# Patient Record
(demographics unavailable — no encounter records)

---

## 2024-12-13 NOTE — PHYSICAL EXAM
[2+] : left foot dorsalis pedis 2+ [Sensation] : the sensory exam was normal to light touch and pinprick [No Focal Deficits] : no focal deficits [Deep Tendon Reflexes (DTR)] : deep tendon reflexes were 2+ and symmetric [Motor Exam] : the motor exam was normal [Ankle Swelling (On Exam)] : not present [Varicose Veins Of Lower Extremities] : not present [] : not present [Delayed in the Right Toes] : capillary refills normal in right toes [Delayed in the Left Toes] : capillary refills normal in the left toes [FreeTextEntry3] : Hair growth noted on digits. Proximal to distal cooling is within normal limits.  [FreeTextEntry1] : She has a 3 to 4mm skin lesion at the left sub 5th metatarsal. It is possible it could be atypical wart. It is possible it could be from an old puncture or deep porokeratotic lesion. It appears benign. It is very painful with direct and side to side palpation. [Diminished Throughout Right Foot] : normal sensation with monofilament testing throughout right foot [Diminished Throughout Left Foot] : normal sensation with monofilament testing throughout left foot

## 2024-12-13 NOTE — HISTORY OF PRESENT ILLNESS
[FreeTextEntry1] : Patient presents today complaining of a painful skin lesion that she has been told were multiple things including a wart. It is routinely shaved and comes back quickly. The last time it was shaved was just 2 weeks ago and the pain level gets as bad as 7/10. It really is interfering with her ability to exercise or walk and she presents today asking for treatment options.

## 2024-12-13 NOTE — ASSESSMENT
[FreeTextEntry1] : Impression: Atypical wart, left foot. Pain.  Treatment: Today I trimmed the area, after prepping with alcohol, and applied an aperture pad. I discussed punch excisional biopsy procedure with the patient. I discussed procedure and postoperative course, but I want her to be off Advil before we do it, so she will reschedule to have it done next week.  Discussed excisional biopsy and possibility of recurrence. She will follow-up for evaluation and treatment.

## 2024-12-20 NOTE — HISTORY OF PRESENT ILLNESS
[FreeTextEntry1] : Patient presents today for evaluation of skin lesion at the base of the 5th metatarsal, left side. It has been treated by multiple people. It is painful, deep and we discussed surgical excision. She presents today for surgical excision.

## 2024-12-20 NOTE — PHYSICAL EXAM
[2+] : left foot dorsalis pedis 2+ [Sensation] : the sensory exam was normal to light touch and pinprick [No Focal Deficits] : no focal deficits [Deep Tendon Reflexes (DTR)] : deep tendon reflexes were 2+ and symmetric [Motor Exam] : the motor exam was normal [Ankle Swelling (On Exam)] : not present [Varicose Veins Of Lower Extremities] : not present [] : not present [Delayed in the Right Toes] : capillary refills normal in right toes [Delayed in the Left Toes] : capillary refills normal in the left toes [FreeTextEntry3] : Hair growth noted on digits. Proximal to distal cooling is within normal limits.  [FreeTextEntry1] : She has a 3 to 4mm skin lesion at the left sub 5th metatarsal.  It appears benign. It is very painful with direct and side to side palpation. [Diminished Throughout Right Foot] : normal sensation with monofilament testing throughout right foot [Diminished Throughout Left Foot] : normal sensation with monofilament testing throughout left foot

## 2024-12-20 NOTE — ASSESSMENT
[FreeTextEntry1] : Impression: Neoplasm left foot. Pain.  Treatment: I went through consent for surgical excision/excisional biopsy left foot lesion. It is at the base of the 5th metatarsal. it appears completely benign.  I went through detailed consent for excisional biopsy. I discussed recurrence of infection. Patient consented.  After obtaining verbal consent, a time out was performed to the identified area. A debridement tray was opened. I injected the foot with 2cc's Xylocaine 1% after prepping with alcohol and Betadine. A strict sterile setup was achieved. I took a 6mm punch biopsy of the lesion full thickness. I used 4-0 Nylon to repair the wound. I put a sterile surgical dressing on that she is going to leave on for 3 days. Keep it dry, rest it and elevate it. It gave her a surgical shoe. Will take Tylenol for pain. I will see her back in 3 days for dressing change.

## 2024-12-23 NOTE — HISTORY OF PRESENT ILLNESS
[FreeTextEntry1] : Patient presents today for evaluation. She is doing well S/P excisional biopsy. Pathology is still pending. There is really no pain.

## 2024-12-23 NOTE — ASSESSMENT
[FreeTextEntry1] : Impression: S/P excisional biopsy neoplasm left foot.  Treatment: I put an Adaptic sterile dressing on with gauze. She will follow-up next week for suture removal.

## 2024-12-23 NOTE — PHYSICAL EXAM
[Ankle Swelling (On Exam)] : not present [Varicose Veins Of Lower Extremities] : not present [] : not present [Delayed in the Right Toes] : capillary refills normal in right toes [Delayed in the Left Toes] : capillary refills normal in the left toes [2+] : left foot dorsalis pedis 2+ [FreeTextEntry3] : Hair growth noted on digits. Proximal to distal cooling is within normal limits.  [FreeTextEntry1] : The incision is coapting nicely. there is no drainage. There is no swelling. It is healing in well. [Sensation] : the sensory exam was normal to light touch and pinprick [No Focal Deficits] : no focal deficits [Deep Tendon Reflexes (DTR)] : deep tendon reflexes were 2+ and symmetric [Motor Exam] : the motor exam was normal [Diminished Throughout Right Foot] : normal sensation with monofilament testing throughout right foot [Diminished Throughout Left Foot] : normal sensation with monofilament testing throughout left foot

## 2025-01-03 NOTE — ASSESSMENT
[FreeTextEntry1] : Impression: Wart, left foot.  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 I discussed this benign finding with the patient. Sutures were removed. She will follow-up in the office for evaluation as needed. She will use Aquaphor for the next few weeks.

## 2025-01-03 NOTE — PHYSICAL EXAM
[2+] : left foot dorsalis pedis 2+ [Sensation] : the sensory exam was normal to light touch and pinprick [No Focal Deficits] : no focal deficits [Deep Tendon Reflexes (DTR)] : deep tendon reflexes were 2+ and symmetric [Motor Exam] : the motor exam was normal [Ankle Swelling (On Exam)] : not present [Varicose Veins Of Lower Extremities] : not present [] : not present [Delayed in the Right Toes] : capillary refills normal in right toes [Delayed in the Left Toes] : capillary refills normal in the left toes [FreeTextEntry3] : Hair growth noted on digits. Proximal to distal cooling is within normal limits.  [FreeTextEntry1] :  Sutures intact. Her incision is dry and non-infected, non-painful. It is totally healed.  [Diminished Throughout Right Foot] : normal sensation with monofilament testing throughout right foot [Diminished Throughout Left Foot] : normal sensation with monofilament testing throughout left foot